# Patient Record
Sex: MALE | Race: WHITE | NOT HISPANIC OR LATINO | Employment: FULL TIME | URBAN - NONMETROPOLITAN AREA
[De-identification: names, ages, dates, MRNs, and addresses within clinical notes are randomized per-mention and may not be internally consistent; named-entity substitution may affect disease eponyms.]

---

## 2018-06-21 ENCOUNTER — TRANSCRIBE ORDERS (OUTPATIENT)
Dept: ADMINISTRATIVE | Facility: HOSPITAL | Age: 53
End: 2018-06-21

## 2018-06-21 DIAGNOSIS — M51.36 DDD (DEGENERATIVE DISC DISEASE), LUMBAR: Primary | ICD-10-CM

## 2018-07-06 ENCOUNTER — HOSPITAL ENCOUNTER (OUTPATIENT)
Dept: MRI IMAGING | Facility: HOSPITAL | Age: 53
Discharge: HOME | End: 2018-07-06
Admitting: NURSE PRACTITIONER

## 2018-07-06 DIAGNOSIS — M51.36 DDD (DEGENERATIVE DISC DISEASE), LUMBAR: ICD-10-CM

## 2018-07-06 PROCEDURE — 72148 MRI LUMBAR SPINE W/O DYE: CPT

## 2018-07-06 PROCEDURE — 72148 MRI LUMBAR SPINE W/O DYE: CPT | Performed by: RADIOLOGY

## 2019-08-01 ENCOUNTER — OFFICE VISIT (OUTPATIENT)
Dept: NEUROSURGERY | Facility: CLINIC | Age: 54
End: 2019-08-01

## 2019-08-01 VITALS
HEART RATE: 56 BPM | BODY MASS INDEX: 25.94 KG/M2 | SYSTOLIC BLOOD PRESSURE: 116 MMHG | DIASTOLIC BLOOD PRESSURE: 77 MMHG | RESPIRATION RATE: 20 BRPM | OXYGEN SATURATION: 98 % | WEIGHT: 181.2 LBS | TEMPERATURE: 98.7 F | HEIGHT: 70 IN

## 2019-08-01 DIAGNOSIS — M51.36 DEGENERATIVE DISC DISEASE, LUMBAR: Primary | ICD-10-CM

## 2019-08-01 PROCEDURE — 99202 OFFICE O/P NEW SF 15 MIN: CPT | Performed by: NEUROLOGICAL SURGERY

## 2019-08-01 RX ORDER — HYDROCODONE BITARTRATE AND ACETAMINOPHEN 7.5; 325 MG/1; MG/1
1 TABLET ORAL EVERY 8 HOURS PRN
COMMUNITY

## 2019-08-01 RX ORDER — METHOCARBAMOL 750 MG/1
750 TABLET, FILM COATED ORAL NIGHTLY
Qty: 30 TABLET | Refills: 0 | Status: SHIPPED | OUTPATIENT
Start: 2019-08-01 | End: 2019-08-31

## 2019-08-01 RX ORDER — NABUMETONE 750 MG/1
750 TABLET, FILM COATED ORAL 2 TIMES DAILY
Qty: 60 TABLET | Refills: 0 | Status: SHIPPED | OUTPATIENT
Start: 2019-08-01

## 2019-08-01 NOTE — PROGRESS NOTES
"Dillon Claire  1965  [id number]      Chief Complaint   Patient presents with   • Back Pain       HISTORY OF PRESENT ILLNESS:  [ 53 y/o with non-dermatomal radiculopathy for 1+ years. Most pain axial and worse with sitting. ]    Past Medical History:   Diagnosis Date   • Back pain    • Hearing loss        Past Surgical History:   Procedure Laterality Date   • HAND SURGERY     • HERNIA REPAIR         Family History   Problem Relation Age of Onset   • Arthritis Mother    • Hypertension Mother    • Diabetes Maternal Grandmother    • Heart disease Paternal Grandmother        Social History     Socioeconomic History   • Marital status:      Spouse name: Not on file   • Number of children: Not on file   • Years of education: Not on file   • Highest education level: Not on file   Tobacco Use   • Smoking status: Current Every Day Smoker     Packs/day: 2.00   • Smokeless tobacco: Never Used   Substance and Sexual Activity   • Alcohol use: No     Frequency: Never   • Drug use: No   • Sexual activity: Defer       No Known Allergies      Current Outpatient Medications:   •  HYDROcodone-acetaminophen (NORCO) 7.5-325 MG per tablet, Take 1 tablet by mouth Every 8 (Eight) Hours As Needed for Moderate Pain ., Disp: , Rfl:     Review of Systems   Constitutional: Positive for activity change.   HENT: Negative.    Eyes: Negative.    Respiratory: Negative.    Cardiovascular: Negative.    Gastrointestinal: Negative.    Musculoskeletal: Positive for back pain.   All other systems reviewed and are negative.      Vitals:    08/01/19 0913   BP: 116/77   BP Location: Left arm   Patient Position: Sitting   Pulse: 56   Resp: 20   Temp: 98.7 °F (37.1 °C)   SpO2: 98%   Weight: 82.2 kg (181 lb 3.2 oz)   Height: 177.8 cm (70\")       Neurological Examination:    Mental status/speech: The patient is alert and oriented.  Speech is clear without aphysia or dysarthria.  No overt cognitive deficits.    Cranial nerve examination:    Olfaction: " Smell is intact.  Vision: Vision is intact without visual field abnormalities.  Funduscopic examination is normal.  No pupillary irregularity.  Ocular motor examination: The extraocular muscles are intact.  There is no diplopia.  The pupil is round and reactive to both light and accommodation.  There is no nystagmus.  Facial movement/sensation: There is no facial weakness.  Sensation is intact in the first, second, and third divisions of the trigeminal nerve.  The corneal reflex is intact.  Auditory: Hearing is intact to finger rub bilaterally.  Cranial nerves IX, X, XI, XII: Phonation is normal.  No dysphagia.  Tongue is protruded in the midline without atrophy.  The gag reflex is intact.  Shoulder shrug is normal.    Musculoligamentous ligamentous examination: [ decreased ROM. SLR negative. Mike's mildly positive. Motor and sensory are negative. Pain in SI joint ]          Medical Decision Making:     Diagnostic Data Set:  [ mild DJD]      Assessment:  [SI joint dysfunction; Deg osteoarthritis ]          Recommendations:  [No surgery indicated. PT with ? TENS unit; stop smoking; Relafen 750mg BID; Robaxin 750mg HS.  ]        I greatly appreciate the opportunity to see and evaluate this individual.  If you have questions or concerns regarding issues that I may have overlooked please call me at any time: [phone].  Harman Grimm M.D.  [facility]  [address] [address]  Scribed for Evelio Grimm MD by Sherry Davis CMA. 8/1/2019  9:26 AM  I have read and concur with the information provided by the scribe.  Evelio Grimm MD

## 2019-08-29 ENCOUNTER — DOCUMENTATION (OUTPATIENT)
Dept: NEUROSURGERY | Facility: CLINIC | Age: 54
End: 2019-08-29

## 2021-03-23 ENCOUNTER — BULK ORDERING (OUTPATIENT)
Dept: CASE MANAGEMENT | Facility: OTHER | Age: 56
End: 2021-03-23

## 2021-03-23 DIAGNOSIS — Z23 IMMUNIZATION DUE: ICD-10-CM

## 2022-01-19 ENCOUNTER — TELEPHONE (OUTPATIENT)
Dept: NEUROSURGERY | Facility: CLINIC | Age: 57
End: 2022-01-19

## 2022-01-19 NOTE — TELEPHONE ENCOUNTER
Caller: FRANKY      Relationship to patient: [facility]     Best call back number: [phone]  Chief complaint: LUMBAR DDD     Type of visit: FOLLOW UP    Requested date: ASAP    If rescheduling, when is the original appointment: LAST SEEN W/ MISTY ON 08/1/2019    Additional notes: REFERRING IS ASKING FOR AN APPT FOR PATIENT W/MAN, NO UPDATED IMAGING.     PLEASE CALL THE PATIENT TO SCHEDULE [phone]    THANK YOU

## 2022-01-19 NOTE — TELEPHONE ENCOUNTER
New referral not necessarily needed, but with no new imaging, patient will need to be scheduled with a PA-C for workup.

## 2022-01-19 NOTE — TELEPHONE ENCOUNTER
I think patient should get worked up by PCP for relevant imaging.  Patient has not been seen in over 2 years

## 2022-01-20 NOTE — TELEPHONE ENCOUNTER
Spoke to patient's spouse. He's had imaging done, but it's been a while. I scheduled patient to see a PA-C. Advised to bring the imaging discs, new insurance card, and photo ID. I mailed a paperwork packet to update paperwork.

## 2022-03-08 ENCOUNTER — TELEPHONE (OUTPATIENT)
Dept: NEUROSURGERY | Facility: CLINIC | Age: 57
End: 2022-03-08

## 2022-03-08 NOTE — TELEPHONE ENCOUNTER
Caller: Haleigh Claire  Relationship: Emergency Contact  Best call back number: [phone], CAN ALSO CALL THE HOME PHONE [phone], SHE MAY NOT BE THERE WHEN CALLING BUT HOME PHONE DOES HAVE VOICEMAIL.       What was the call regarding:   PATIENT'S WIFE CALLED TO RESCHEDULE PATIENT'S 3/11 APPOINTMENT WITH [facility] AS PATIENT HAS TO WORK THIS Friday.     I AM NOT SHOWING ANOTHER Friday APPOINTMENT WITH [facility] AND PATIENT HAS TO HAVE A Friday APPOINTMENT AS HE WORKS Monday THROUGH Thursday. PATRICIA LUCERO HAS INSTRUCTIONS THAT PREVIOUS PATIENT'S OF  SHOULD BE SCHEDULED WITH [facility]RER[facility] BUT SHE IS NOT SHOWING ANY Friday AVAILABILITY.     PLEASE CALL PATIENT'S WIFE BACK ABOUT RESCHEDULING THE 3/11 APPOINTMENT.